# Patient Record
(demographics unavailable — no encounter records)

---

## 2025-07-28 NOTE — ASSESSMENT
[Diabetes Foot Care] : diabetes foot care [Long Term Vascular Complications] : long term vascular complications of diabetes [Carbohydrate Consistent Diet] : carbohydrate consistent diet [Importance of Diet and Exercise] : importance of diet and exercise to improve glycemic control, achieve weight loss and improve cardiovascular health [Exercise/Effect on Glucose] : exercise/effect on glucose [Hypoglycemia Management] : hypoglycemia management [Glucagon Use] : glucagon use [Ketone Testing] : ketone testing [Action and use of Insulin] : action and use of short and long-acting insulin [Self Monitoring of Blood Glucose] : self monitoring of blood glucose [Insulin Self-Administration] : insulin self-administration [Injection Technique, Storage, Sharps Disposal] : injection technique, storage, and sharps disposal [Sick-Day Management] : sick-day management [Retinopathy Screening] : Patient was referred to ophthalmology for retinopathy screening [Diabetic Medications] : Risks and benefits of diabetic medications were discussed [FreeTextEntry1] : Type 2 Diabetes uncontrolled: A1c:7.5% Glucose: 142 Current regimen: Metformin 1000mg QD, Jardiance 10mg ( per pt)- Will confirm with pharmacy and [facility] New regimen: Will maximize current therapy for now, Metformin 1000mg BID and Jardiance 25mg  Advised to hydrate sufficiently  ophthalmology exam: up to date podiatry exam: up to date LDL: will get copy from PCP GFR: will get copy from PCP ace/arb: lisinopril? statin: need med rec  BP:121/75 BMI:23

## 2025-07-28 NOTE — PHYSICAL EXAM
[Alert] : alert [Well Nourished] : well nourished [No Acute Distress] : no acute distress [Well Developed] : well developed [Normal Sclera/Conjunctiva] : normal sclera/conjunctiva [EOMI] : extra ocular movement intact [No Proptosis] : no proptosis [Normal Oropharynx] : the oropharynx was normal [Thyroid Not Enlarged] : the thyroid was not enlarged [No Thyroid Nodules] : no palpable thyroid nodules [No Respiratory Distress] : no respiratory distress [No Accessory Muscle Use] : no accessory muscle use [Clear to Auscultation] : lungs were clear to auscultation bilaterally [Normal S1, S2] : normal S1 and S2 [Normal Rate] : heart rate was normal [Regular Rhythm] : with a regular rhythm [No Edema] : no peripheral edema [Pedal Pulses Normal] : the pedal pulses are present [Normal Bowel Sounds] : normal bowel sounds [Not Tender] : non-tender [Not Distended] : not distended [Soft] : abdomen soft [Normal Anterior Cervical Nodes] : no anterior cervical lymphadenopathy [Normal Posterior Cervical Nodes] : no posterior cervical lymphadenopathy [No Spinal Tenderness] : no spinal tenderness [Spine Straight] : spine straight [No Stigmata of Cushings Syndrome] : no stigmata of Cushings Syndrome [Normal Gait] : normal gait [Normal Strength/Tone] : muscle strength and tone were normal [No Rash] : no rash [Normal Reflexes] : deep tendon reflexes were 2+ and symmetric [No Tremors] : no tremors [Oriented x3] : oriented to person, place, and time [Acanthosis Nigricans] : no acanthosis nigricans [de-identified] : uses rolling walker

## 2025-07-28 NOTE — HISTORY OF PRESENT ILLNESS
[FreeTextEntry1] : HPI: 63 year old female presenting to \[facility]\"" care. Was unsure why she is here but reports history of Diabetes  NO information presented with patient.    PMHx: DM2   Allergies: NKDA     Diabetes History:   When and how diagnosed: 2014 was diagnosed, has only been on oral medications. States her PCP is with [facility] in [place] and has been managing her DM.  Type of Diabetes: DM2 Hx of DKA or HHS?  Current DM medication regimen: Metformin 1000mg QD, Jardiance 10mg QD (patient reported)  Fingerstick Monitoring: FSG -200s FSG PM FSG Bedtime Last HBA1C: >7% A1c: 7.5%     LIFESTYLE FACTORS:  Eating patterns and weight history:  Diet: diabetic diet at [facility]  Breakfast: at [facility]  Lunch: at [facility]  Dinner: fruits  Snacks: yogurt, cheerios, 10 grapes  Beverages: diet cranberry, low fat milk, water    Activity/Sleep: walks a lot with her rolling walker    Complications: Macrovascular- Denies  CVA/Stroke? CVD? PVD?   Microvascular- Retinopathy? none  Nephropathy? none  Neuropathy? gabapentin for leg pain    Follow up care: PCP: [facility] [address] Ophthalmology: goes regularly  Podiatry: does see one - has a foot doctor in the center  Nutrition: yes has seen in the past      Surgical History: none    Family History: no significant family history  DM- sister Thyroid- Autoimmune- Other- MI - mother    Social History: occupation- disability  support system- ETOH use- no alcohol use  Tobacco Hx- never a smoker Additional substance use-   Additional Medications: OTC vitamins and supplements: B12   Technology Use: Glucose Monitoring (meter/CMG): has meter, can't remember name but pcp provided